# Patient Record
Sex: MALE | URBAN - METROPOLITAN AREA
[De-identification: names, ages, dates, MRNs, and addresses within clinical notes are randomized per-mention and may not be internally consistent; named-entity substitution may affect disease eponyms.]

---

## 2024-06-10 ENCOUNTER — APPOINTMENT (RX ONLY)
Dept: URBAN - METROPOLITAN AREA CLINIC 94 | Facility: CLINIC | Age: 44
Setting detail: DERMATOLOGY
End: 2024-06-10

## 2024-06-10 VITALS — WEIGHT: 310 LBS | HEIGHT: 75 IN

## 2024-06-10 DIAGNOSIS — L30.4 ERYTHEMA INTERTRIGO: ICD-10-CM | Status: INADEQUATELY CONTROLLED

## 2024-06-10 DIAGNOSIS — L20.89 OTHER ATOPIC DERMATITIS: ICD-10-CM

## 2024-06-10 PROCEDURE — ? TREATMENT REGIMEN

## 2024-06-10 PROCEDURE — ? COUNSELING

## 2024-06-10 PROCEDURE — 99203 OFFICE O/P NEW LOW 30 MIN: CPT

## 2024-06-10 PROCEDURE — ? PRESCRIPTION

## 2024-06-10 RX ORDER — CICLOPIROX OLAMINE 7.7 MG/G
CREAM TOPICAL
Qty: 90 | Refills: 1 | Status: ERX | COMMUNITY
Start: 2024-06-10

## 2024-06-10 RX ORDER — CLOBETASOL PROPIONATE 0.5 MG/G
OINTMENT TOPICAL
Qty: 60 | Refills: 1 | Status: ERX | COMMUNITY
Start: 2024-06-10

## 2024-06-10 RX ADMIN — CLOBETASOL PROPIONATE: 0.5 OINTMENT TOPICAL at 00:00

## 2024-06-10 RX ADMIN — CICLOPIROX OLAMINE: 7.7 CREAM TOPICAL at 00:00

## 2024-06-10 ASSESSMENT — BSA RASH: BSA RASH: 7

## 2024-06-10 ASSESSMENT — BSA ECZEMA: % BODY COVERED IN ECZEMA: 3

## 2024-06-10 ASSESSMENT — SEVERITY ASSESSMENT: SEVERITY: MODERATE

## 2024-06-10 ASSESSMENT — PAIN INTENSITY VAS: HOW INTENSE IS YOUR PAIN 0 BEING NO PAIN, 10 BEING THE MOST SEVERE PAIN POSSIBLE?: NO PAIN

## 2024-06-10 ASSESSMENT — SEVERITY ASSESSMENT 2020: SEVERITY 2020: MODERATE

## 2024-06-10 NOTE — PROCEDURE: TREATMENT REGIMEN
Detail Level: Zone
Plan: Pt notes it gets worse when sweating
Plan: Advised on avoid hot water\\nFragrance free soaps\\nMoisturizing cream bid to tidy
Initiate Treatment: Clobetasol oint bid to hands
WDL

## 2024-10-10 ENCOUNTER — APPOINTMENT (RX ONLY)
Dept: URBAN - METROPOLITAN AREA CLINIC 94 | Facility: CLINIC | Age: 44
Setting detail: DERMATOLOGY
End: 2024-10-10

## 2024-10-10 DIAGNOSIS — L30.4 ERYTHEMA INTERTRIGO: ICD-10-CM | Status: IMPROVED

## 2024-10-10 DIAGNOSIS — L20.89 OTHER ATOPIC DERMATITIS: ICD-10-CM | Status: INADEQUATELY CONTROLLED

## 2024-10-10 PROCEDURE — 99214 OFFICE O/P EST MOD 30 MIN: CPT | Mod: 25

## 2024-10-10 PROCEDURE — ? TREATMENT REGIMEN

## 2024-10-10 PROCEDURE — ? DUPIXENT INITIATION

## 2024-10-10 PROCEDURE — ? DUPIXENT INJECTION

## 2024-10-10 PROCEDURE — 96372 THER/PROPH/DIAG INJ SC/IM: CPT

## 2024-10-10 PROCEDURE — ? SEPARATE AND IDENTIFIABLE DOCUMENTATION

## 2024-10-10 PROCEDURE — ? PRESCRIPTION

## 2024-10-10 PROCEDURE — ? COUNSELING

## 2024-10-10 RX ORDER — TACROLIMUS 1 MG/G
OINTMENT TOPICAL
Qty: 60 | Refills: 0 | Status: ERX | COMMUNITY
Start: 2024-10-10

## 2024-10-10 RX ADMIN — TACROLIMUS: 1 OINTMENT TOPICAL at 00:00

## 2024-10-10 ASSESSMENT — LOCATION SIMPLE DESCRIPTION DERM
LOCATION SIMPLE: LEFT THIGH
LOCATION SIMPLE: RIGHT THIGH

## 2024-10-10 ASSESSMENT — SEVERITY ASSESSMENT 2020: SEVERITY 2020: MODERATE

## 2024-10-10 ASSESSMENT — BSA ECZEMA: % BODY COVERED IN ECZEMA: 45

## 2024-10-10 ASSESSMENT — ITCH NUMERIC RATING SCALE: HOW SEVERE IS YOUR ITCHING?: 8

## 2024-10-10 ASSESSMENT — LOCATION DETAILED DESCRIPTION DERM
LOCATION DETAILED: RIGHT ANTERIOR PROXIMAL THIGH
LOCATION DETAILED: LEFT ANTERIOR PROXIMAL THIGH

## 2024-10-10 ASSESSMENT — LOCATION ZONE DERM: LOCATION ZONE: LEG

## 2024-10-10 NOTE — PROCEDURE: DUPIXENT INITIATION
Detail Level: Zone
Is Azathioprine Contraindicated?: No
Diagnosis (Required): Atopic Dermatitis/Eczematous Dermatitis
Pregnancy And Lactation Warning Text: There have not been adverse fetal risks in women taking Dupixent while pregnant. It is unknown if this medication is excreted in breast milk.
Dupixent Dosing Override: 600 mg once SC injection on Day0, followed by a maintenance dose of 300 mg every 4 weeks
Dupixent Monitoring Guidelines: There is no laboratory monitoring requirement with Dupixent.
Dupixent Dosing: 600 mg SC day 0 then 300 mg SC every other week

## 2024-10-10 NOTE — PROCEDURE: DUPIXENT INJECTION
Detail Level: None
Hide Non-Enhanced Ndc Variable: No
Expiration Date (Optional): 2026/11
Ndc (200 Mg Prefilled Syringe): [address]-77
J-Code: 
Treatment Number (Optional): 1
Syringe Size Used (Required For Enhanced Ndc): 300 mg/2ml prefilled syringe
Dupixent Dosing: 600 mg
Use Enhanced Ndc?: Yes
Ndc (200 Mg Prefilled Pen): 8658-6594-21
Date Of Next Injection: 2 Weeks
Consent: The risks of pain and injection site reactions were reviewed with the patient prior to the injection.
Lot # (Optional): [id number]
Administered By (Optional): Lyndsey MORALES MA
Ndc (300 Mg Prefilled Syringe): [address]-81
Ndc (300 Mg Prefilled Pen): [address]-13

## 2024-10-10 NOTE — PROCEDURE: TREATMENT REGIMEN
Continue Regimen: Clobetasol oint
Plan: Begin dupixent. Sample starter dose given today.
Detail Level: Zone
Plan: Pt notes it gets worse when sweating

## 2024-10-22 ENCOUNTER — RX ONLY (OUTPATIENT)
Age: 44
Setting detail: RX ONLY
End: 2024-10-22

## 2024-10-22 RX ORDER — DUPILUMAB 300 MG/2ML
INJECTION, SOLUTION SUBCUTANEOUS
Qty: 4 | Refills: 6 | Status: ERX | COMMUNITY
Start: 2024-10-22